# Patient Record
Sex: MALE | Race: WHITE | NOT HISPANIC OR LATINO | Employment: OTHER | URBAN - METROPOLITAN AREA
[De-identification: names, ages, dates, MRNs, and addresses within clinical notes are randomized per-mention and may not be internally consistent; named-entity substitution may affect disease eponyms.]

---

## 2017-09-13 ENCOUNTER — OFFICE VISIT (OUTPATIENT)
Dept: RADIATION ONCOLOGY | Facility: HOSPITAL | Age: 67
End: 2017-09-13

## 2017-09-13 ENCOUNTER — HOSPITAL ENCOUNTER (OUTPATIENT)
Dept: RADIATION ONCOLOGY | Facility: HOSPITAL | Age: 67
Setting detail: RADIATION/ONCOLOGY SERIES
Discharge: HOME | End: 2017-09-13

## 2017-09-13 VITALS
WEIGHT: 177.9 LBS | RESPIRATION RATE: 20 BRPM | HEIGHT: 68 IN | TEMPERATURE: 98 F | DIASTOLIC BLOOD PRESSURE: 68 MMHG | OXYGEN SATURATION: 99 % | HEART RATE: 65 BPM | SYSTOLIC BLOOD PRESSURE: 136 MMHG | BODY MASS INDEX: 26.96 KG/M2

## 2017-09-13 DIAGNOSIS — C61 PROSTATE CANCER (HCC): Primary | ICD-10-CM

## 2017-09-13 PROCEDURE — G0463 HOSPITAL OUTPT CLINIC VISIT: HCPCS

## 2017-09-13 RX ORDER — LOSARTAN POTASSIUM 50 MG/1
TABLET ORAL
Refills: 3 | COMMUNITY
Start: 2017-07-02

## 2017-09-13 RX ORDER — DICLOFENAC SODIUM 75 MG/1
TABLET, DELAYED RELEASE ORAL
Refills: 3 | COMMUNITY
Start: 2017-07-02

## 2017-09-13 RX ORDER — PRAVASTATIN SODIUM 40 MG
TABLET ORAL
Refills: 3 | COMMUNITY
Start: 2017-07-24

## 2017-09-13 RX ORDER — ASPIRIN 81 MG/1
81 TABLET, CHEWABLE ORAL DAILY
COMMUNITY

## 2017-09-13 NOTE — PROGRESS NOTES
CONSULTATION NOTE      :                                                          1950  DATE OF CONSULTATION:                       2017   REQUESTING PHYSICIAN:                   Errol Mccormick MD  REASON FOR CONSULTATION:           Prostate cancer    Staging form: Prostate, AJCC V7      Clinical stage from 2017: Stage IIA (T1c, N0, M0, PSA: Less than 10, Laxmi 7)      BRIEF HISTORY:  The patient is a very pleasant 67 y.o. male  with recently diagnosed prostate cancer.  Presented with a sharply elevated rise in PSA value 10.4 ng/ml on 2017.  Patient states that this was drawn approximate 3 weeks after cystoscopic manipulation for ureteral stent.  Repeat PSA 7/3/2017 was 8.6 ng/ml.  He is having no urinary symptoms at this time.  Prostate volume was 28.8 cm³ on ultrasound, symmetrical and homogeneous without suspicious abnormalities.  Biopsy performed 2017 showed a single focus of adenocarcinoma, [place] score 3+4 = 7 involving the right lobe, 4% of submitted tissue.  Left lobe was benign.  Prolaris genetic evaluation is pending.     No Known Allergies    Social History     Social History   • Marital status:      Spouse name: N/A   • Number of children: N/A   • Years of education: N/A     Social History Main Topics   • Smoking status: Former Smoker     Packs/day: 1.00     Years: 20.00     Types: Pipe     Quit date:    • Smokeless tobacco: None   • Alcohol use Yes      Comment: 5 drinks per week   • Drug use: Defer   • Sexual activity: Defer     Other Topics Concern   • None     Social History Narrative   • None       Past Medical History:   Diagnosis Date   • Kidney stone    • Prostate cancer        family history includes Heart disease in his father; Hypertension in his mother.     History reviewed. No pertinent surgical history.     Review of Systems   Constitutional: Negative.    HENT:   Positive for hearing loss and tinnitus.    Eyes: Negative.    Respiratory: Negative.     Cardiovascular: Negative.    Gastrointestinal: Positive for diarrhea.   Endocrine: Negative.    Genitourinary: Negative.     Musculoskeletal: Positive for back pain, neck pain and neck stiffness.   Neurological: Negative.    Hematological: Bruises/bleeds easily.   Psychiatric/Behavioral: Negative.         IPSS Questionnaire (AUA-7):  Over the past month…    1)  Incomplete Emptying  How often have you had a sensation of not emptying your bladder?  2 - Less than half the time   2)  Frequency  How often have you had to urinate less than every two hours? 1 - Less than 1 time in 5   3)  Intermittency  How often have you found you stopped and started again several times when you urinated?  2 - Less than half the time   4) Urgency  How often have you found it difficult to postpone urination?  2 - Less than half the time   5) Weak Stream  How often have you had a weak urinary stream?  2 - Less than half the time   6) Straining  How often have you had to push or strain to begin urination?  2 - Less than half the time   7) Nocturia  How many times did you typically get up at night to urinate?  1 - 1 time   Total Score:  10       Quality of life due to urinary symptoms:  If you were to spend the rest of your life with your urinary condition the way it is now, how would you feel about that? 3-Mixed   Urine Leakage (Incontinence) 0-No Leakage     Sexual Health Inventory  Current Status    1)  How do you rate your confidence that you could achieve and keep an erection? 4-High   2) When you had erections with sexual stimulation, how often were your erections hard enough for penetration (entering your partner)? 5-Almost always or always   3)  During sexual intercourse, how often were you able to maintain your erection after you had penetrated (entered) into your partner? 5-Almost always or always   4) During sexual intercourse, how difficult was it to maintain your erection to completion of intercourse? 5-Not difficult   5) When  "you attempted sexual intercourse, how often was it satisfactory to you? 5-Almost always or always   Total Score: 24       Bowel Health Inventory  Current Status: 0-No problems, no rectal bleeding, no discharge, less then 5 bowel movements a day                Objective   VITAL SIGNS:   Vitals:    09/13/17 1031   BP: 136/68   Pulse: 65   Resp: 20   Temp: 98 °F (36.7 °C)   TempSrc: Temporal Artery    SpO2: 99%   Weight: 177 lb 14.4 oz (80.7 kg)   Height: 68.25\" (173.4 cm)   PainSc: 0-No pain        Karnofsky score: 90      Physical Exam   Constitutional: He is oriented to person, place, and time. He appears well-developed and well-nourished.   HENT:   Head: Normocephalic and atraumatic.   Neck: Normal range of motion. Neck supple.   Cardiovascular: Normal rate, regular rhythm and normal heart sounds.    No murmur heard.  Pulmonary/Chest: Effort normal and breath sounds normal. He has no wheezes. He has no rales.   Abdominal: Soft. Bowel sounds are normal. He exhibits no distension. There is no hepatosplenomegaly. There is no tenderness.   Musculoskeletal: Normal range of motion. He exhibits no edema or tenderness.   Lymphadenopathy:     He has no cervical adenopathy.     He has no axillary adenopathy.        Right: No supraclavicular adenopathy present.        Left: No supraclavicular adenopathy present.   Neurological: He is alert and oriented to person, place, and time. He has normal strength. No sensory deficit.   Skin: Skin is warm and dry.   Psychiatric: He has a normal mood and affect. His behavior is normal. Judgment and thought content normal.   Nursing note and vitals reviewed.           The following portions of the patient's history were reviewed and updated as appropriate: allergies, current medications, past family history, past medical history, past social history, past surgical history and problem list.    Assessment      Prostate cancer, [place] score 3+4 = 7, clinical stage IIA (T1c, N0, M0) with most " recent PSA 8.6 ng/ml.  (Diagnosis was made after an even higher PSA following manipulation.)  He has low volume disease.    He's considering active surveillance versus definitive treatment.  He is waiting until we have the results of Prolaris genetic testing.  If this predicts a more aggressive neoplasm he is more inclined to treat.  If Prolaris score is low he would most likely undergo active surveillance.  If the score is indeterminate, he will consider second opinion with outside pathology review.  We did review the diagnosis and overall excellent prognosis for this disease.  I would be in favor of obtaining another PSA next month before he returns to [place] for the winter.  This would be a 3 month follow-up from the previous value to see if his PSA is still coming down after manipulation, which may indicate an even better prognosis, A lower PSA would be reassuring if he chooses active surveillance.  We also reviewed today the use of definitive radiotherapy with various modalities including conventionally fractionated IMRT, hypo-fractionated SBRT and brachytherapy.    RECOMMENDATIONS:  I'll be happy to see him again at any time if he has additional questions or if he decides to initiate treatment.     Laurent Arnold MD